# Patient Record
Sex: MALE | Race: WHITE | NOT HISPANIC OR LATINO | URBAN - METROPOLITAN AREA
[De-identification: names, ages, dates, MRNs, and addresses within clinical notes are randomized per-mention and may not be internally consistent; named-entity substitution may affect disease eponyms.]

---

## 2017-02-14 ENCOUNTER — OUTPATIENT (OUTPATIENT)
Dept: OUTPATIENT SERVICES | Facility: HOSPITAL | Age: 32
LOS: 1 days | Discharge: HOME | End: 2017-02-14

## 2017-02-15 DIAGNOSIS — F11.20 OPIOID DEPENDENCE, UNCOMPLICATED: ICD-10-CM

## 2017-08-01 ENCOUNTER — OUTPATIENT (OUTPATIENT)
Dept: OUTPATIENT SERVICES | Facility: HOSPITAL | Age: 32
LOS: 1 days | End: 2017-08-01
Payer: MEDICAID

## 2017-08-04 DIAGNOSIS — R69 ILLNESS, UNSPECIFIED: ICD-10-CM

## 2017-10-01 PROCEDURE — G9001: CPT

## 2018-06-12 ENCOUNTER — OUTPATIENT (OUTPATIENT)
Dept: OUTPATIENT SERVICES | Facility: HOSPITAL | Age: 33
LOS: 1 days | End: 2018-06-12

## 2018-06-12 VITALS
SYSTOLIC BLOOD PRESSURE: 110 MMHG | DIASTOLIC BLOOD PRESSURE: 74 MMHG | TEMPERATURE: 98 F | OXYGEN SATURATION: 98 % | WEIGHT: 190.04 LBS | HEIGHT: 69 IN | RESPIRATION RATE: 16 BRPM | HEART RATE: 65 BPM

## 2018-06-12 DIAGNOSIS — S82.899A OTHER FRACTURE OF UNSPECIFIED LOWER LEG, INITIAL ENCOUNTER FOR CLOSED FRACTURE: Chronic | ICD-10-CM

## 2018-06-12 DIAGNOSIS — M19.271 SECONDARY OSTEOARTHRITIS, RIGHT ANKLE AND FOOT: ICD-10-CM

## 2018-06-12 DIAGNOSIS — R06.83 SNORING: ICD-10-CM

## 2018-06-12 DIAGNOSIS — F41.9 ANXIETY DISORDER, UNSPECIFIED: ICD-10-CM

## 2018-06-12 DIAGNOSIS — S02.609A FRACTURE OF MANDIBLE, UNSPECIFIED, INITIAL ENCOUNTER FOR CLOSED FRACTURE: Chronic | ICD-10-CM

## 2018-06-12 LAB
BUN SERPL-MCNC: 14 MG/DL — SIGNIFICANT CHANGE UP (ref 7–23)
CALCIUM SERPL-MCNC: 9.6 MG/DL — SIGNIFICANT CHANGE UP (ref 8.4–10.5)
CHLORIDE SERPL-SCNC: 94 MMOL/L — LOW (ref 98–107)
CO2 SERPL-SCNC: 27 MMOL/L — SIGNIFICANT CHANGE UP (ref 22–31)
CREAT SERPL-MCNC: 0.87 MG/DL — SIGNIFICANT CHANGE UP (ref 0.5–1.3)
GLUCOSE SERPL-MCNC: 73 MG/DL — SIGNIFICANT CHANGE UP (ref 70–99)
HCT VFR BLD CALC: 41.6 % — SIGNIFICANT CHANGE UP (ref 39–50)
HGB BLD-MCNC: 13.8 G/DL — SIGNIFICANT CHANGE UP (ref 13–17)
MCHC RBC-ENTMCNC: 28.5 PG — SIGNIFICANT CHANGE UP (ref 27–34)
MCHC RBC-ENTMCNC: 33.2 % — SIGNIFICANT CHANGE UP (ref 32–36)
MCV RBC AUTO: 86 FL — SIGNIFICANT CHANGE UP (ref 80–100)
NRBC # FLD: 0 — SIGNIFICANT CHANGE UP
PLATELET # BLD AUTO: 272 K/UL — SIGNIFICANT CHANGE UP (ref 150–400)
PMV BLD: 10 FL — SIGNIFICANT CHANGE UP (ref 7–13)
POTASSIUM SERPL-MCNC: 4.4 MMOL/L — SIGNIFICANT CHANGE UP (ref 3.5–5.3)
POTASSIUM SERPL-SCNC: 4.4 MMOL/L — SIGNIFICANT CHANGE UP (ref 3.5–5.3)
RBC # BLD: 4.84 M/UL — SIGNIFICANT CHANGE UP (ref 4.2–5.8)
RBC # FLD: 13.4 % — SIGNIFICANT CHANGE UP (ref 10.3–14.5)
SODIUM SERPL-SCNC: 136 MMOL/L — SIGNIFICANT CHANGE UP (ref 135–145)
WBC # BLD: 7.92 K/UL — SIGNIFICANT CHANGE UP (ref 3.8–10.5)
WBC # FLD AUTO: 7.92 K/UL — SIGNIFICANT CHANGE UP (ref 3.8–10.5)
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
## 2018-06-12 NOTE — H&P PST ADULT - ACTIVITY
walk daily, climb up 1 flight of stair without sob, adls, used to do wt lifting, exercise at gym - not anymore d/t pain

## 2018-06-12 NOTE — H&P PST ADULT - NEGATIVE GENERAL GENITOURINARY SYMPTOMS
no flank pain L/no urine discoloration/no hematuria/no flank pain R/no bladder infections/no gas in urine/no dysuria/normal urinary frequency/no urinary hesitancy

## 2018-06-12 NOTE — H&P PST ADULT - NEGATIVE ALLERGY TYPES
no indoor environmental allergies/no reactions to medicines/no reactions to food/no outdoor environmental allergies

## 2018-06-12 NOTE — H&P PST ADULT - FAMILY HISTORY
Father  Still living? Yes, Estimated age: Age Unknown  Family history of kidney cancer, Age at diagnosis: Age Unknown

## 2018-06-12 NOTE — H&P PST ADULT - PMH
Heroin abuse ADD (attention deficit disorder)    Anxiety    Heroin abuse  pt states, "stopped using heroin 3 years ago"
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
## 2018-06-12 NOTE — H&P PST ADULT - RS GEN PE MLT RESP DETAILS PC
clear to auscultation bilaterally/no rales/no rhonchi/no wheezes/respirations non-labored/good air movement/breath sounds equal

## 2018-06-12 NOTE — H&P PST ADULT - MUSCULOSKELETAL
details… detailed exam normal strength/joint swelling/right ankle - tenderness on palpation/no calf tenderness

## 2018-06-12 NOTE — H&P PST ADULT - PROBLEM SELECTOR PLAN 1
Scheduled for right ankle fusion, bone graft fibula, removal of hardware on 6/22/2018. labs done and results pending. Surgical scrub & preop instruction given and explained. Famotidine provided with instruction. Verbalized understanding.

## 2018-06-21 ENCOUNTER — TRANSCRIPTION ENCOUNTER (OUTPATIENT)
Age: 33
End: 2018-06-21

## 2018-06-22 ENCOUNTER — OUTPATIENT (OUTPATIENT)
Dept: OUTPATIENT SERVICES | Facility: HOSPITAL | Age: 33
LOS: 1 days | Discharge: HOME | End: 2018-06-22

## 2018-06-22 VITALS
RESPIRATION RATE: 16 BRPM | OXYGEN SATURATION: 97 % | SYSTOLIC BLOOD PRESSURE: 127 MMHG | TEMPERATURE: 98 F | HEIGHT: 69 IN | WEIGHT: 190.04 LBS | HEART RATE: 82 BPM | DIASTOLIC BLOOD PRESSURE: 78 MMHG

## 2018-06-22 VITALS
SYSTOLIC BLOOD PRESSURE: 122 MMHG | HEART RATE: 72 BPM | OXYGEN SATURATION: 100 % | RESPIRATION RATE: 16 BRPM | DIASTOLIC BLOOD PRESSURE: 73 MMHG

## 2018-06-22 DIAGNOSIS — S02.609A FRACTURE OF MANDIBLE, UNSPECIFIED, INITIAL ENCOUNTER FOR CLOSED FRACTURE: Chronic | ICD-10-CM

## 2018-06-22 DIAGNOSIS — M19.271 SECONDARY OSTEOARTHRITIS, RIGHT ANKLE AND FOOT: ICD-10-CM

## 2018-06-22 DIAGNOSIS — S82.899A OTHER FRACTURE OF UNSPECIFIED LOWER LEG, INITIAL ENCOUNTER FOR CLOSED FRACTURE: Chronic | ICD-10-CM

## 2018-06-22 NOTE — ASU PREOPERATIVE ASSESSMENT, ADULT ([facility] ONLY) - TEACHING/LEARNING LEARNING PREFERENCES
individual instruction/pictorial/skill demonstration/verbal instruction/group instruction/written material

## 2018-06-22 NOTE — ASU DISCHARGE PLAN (ADULT/PEDIATRIC). - ACTIVITY LEVEL
NON WEIGHT BEARING TO RIGHT FOOT/no sports/gym/no exercise/no heavy lifting/no weight bearing/elevate extremity/no tub baths

## 2018-06-22 NOTE — ASU DISCHARGE PLAN (ADULT/PEDIATRIC). - NOTIFY
Persistent Nausea and Vomiting/Fever greater than 101/Inability to Tolerate Liquids or Foods/Pain not relieved by Medications/Bleeding that does not stop/Swelling that continues

## 2018-07-28 PROBLEM — F41.9 ANXIETY DISORDER, UNSPECIFIED: Chronic | Status: ACTIVE | Noted: 2018-06-12

## 2018-07-28 PROBLEM — F98.8 OTHER SPECIFIED BEHAVIORAL AND EMOTIONAL DISORDERS WITH ONSET USUALLY OCCURRING IN CHILDHOOD AND ADOLESCENCE: Chronic | Status: ACTIVE | Noted: 2018-06-12

## 2018-08-16 ENCOUNTER — OUTPATIENT (OUTPATIENT)
Dept: OUTPATIENT SERVICES | Facility: HOSPITAL | Age: 33
LOS: 1 days | End: 2018-08-16
Payer: MEDICAID

## 2018-08-16 VITALS
WEIGHT: 195.11 LBS | DIASTOLIC BLOOD PRESSURE: 72 MMHG | HEIGHT: 70 IN | TEMPERATURE: 98 F | SYSTOLIC BLOOD PRESSURE: 129 MMHG | HEART RATE: 83 BPM | RESPIRATION RATE: 16 BRPM | OXYGEN SATURATION: 97 %

## 2018-08-16 DIAGNOSIS — S82.899A OTHER FRACTURE OF UNSPECIFIED LOWER LEG, INITIAL ENCOUNTER FOR CLOSED FRACTURE: Chronic | ICD-10-CM

## 2018-08-16 DIAGNOSIS — S02.609A FRACTURE OF MANDIBLE, UNSPECIFIED, INITIAL ENCOUNTER FOR CLOSED FRACTURE: Chronic | ICD-10-CM

## 2018-08-16 DIAGNOSIS — S91.301A UNSPECIFIED OPEN WOUND, RIGHT FOOT, INITIAL ENCOUNTER: ICD-10-CM

## 2018-08-16 DIAGNOSIS — Z01.818 ENCOUNTER FOR OTHER PREPROCEDURAL EXAMINATION: ICD-10-CM

## 2018-08-16 DIAGNOSIS — F11.10 OPIOID ABUSE, UNCOMPLICATED: ICD-10-CM

## 2018-08-16 DIAGNOSIS — T14.8XXA OTHER INJURY OF UNSPECIFIED BODY REGION, INITIAL ENCOUNTER: ICD-10-CM

## 2018-08-16 PROCEDURE — G0463: CPT

## 2018-08-16 PROCEDURE — 85027 COMPLETE CBC AUTOMATED: CPT

## 2018-08-16 RX ORDER — OXYCODONE HYDROCHLORIDE 5 MG/1
1 TABLET ORAL
Qty: 0 | Refills: 0 | COMMUNITY

## 2018-08-16 RX ORDER — METHADONE HYDROCHLORIDE 40 MG/1
1 TABLET ORAL
Qty: 0 | Refills: 0 | COMMUNITY
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
## 2018-09-07 PROBLEM — F32.9 MAJOR DEPRESSIVE DISORDER, SINGLE EPISODE, UNSPECIFIED: Chronic | Status: ACTIVE | Noted: 2018-08-16

## 2018-09-07 PROBLEM — S82.891S OTHER FRACTURE OF RIGHT LOWER LEG, SEQUELA: Chronic | Status: ACTIVE | Noted: 2018-08-16

## 2018-09-07 PROBLEM — T14.8XXA OTHER INJURY OF UNSPECIFIED BODY REGION, INITIAL ENCOUNTER: Chronic | Status: ACTIVE | Noted: 2018-08-16
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
## 2018-09-14 ENCOUNTER — APPOINTMENT (OUTPATIENT)
Dept: WOUND CARE | Facility: HOSPITAL | Age: 33
End: 2018-09-14

## 2018-10-01 ENCOUNTER — APPOINTMENT (OUTPATIENT)
Dept: WOUND CARE | Facility: CLINIC | Age: 33
End: 2018-10-01

## 2020-04-13 NOTE — H&P PST ADULT - PRO INTERPRETER NEED 2
NUTRITION    Nursing Referral: [facility]     RECOMMENDATIONS / PLAN:     - Add supplement: Glucerna Shake BID  - Continue RD inpatient monitoring and evaluation. NUTRITION INTERVENTIONS & DIAGNOSIS:     - Meals/snacks: modified composition  - Medical food supplement therapy: initiate     Nutrition Diagnosis: Unintended weight loss related to predicted prolonged inadequate energy intake as evidenced by wt loss of 14% x 5 years PTA    ASSESSMENT:     Per chart review, pt admitted with c/o worsening cough and SOB; COPD exacerbation and suspected COVID-19 virus infection. Remote assessment via telephone attempted; unsuccessful. Per chart, pt had poor po intake of breakfast today; noted wt loss in last 5 years per chart hx.     Nutritional intake adequate to meet patients estimated nutritional needs:  Unable to determine at this time    Diet: DIET DIABETIC CONSISTENT CARB Regular      Food Allergies: none known   Current Appetite: Unable to determine at this time  Appetite/meal intake prior to admission: Unable to determine at this time  Feeding Limitations:  [] Swallowing difficulty    [] Chewing difficulty    [] Other:  Current Meal Intake:   Patient Vitals for the past 100 hrs:   % Diet Eaten   04/13/20 0935 25 %       BM: 4/12  Skin Integrity:  No pressure injury or wound noted  Edema:   [x] No     [] Yes   Pertinent Medications: Reviewed: bowel regimen, cholecalciferol, famotidine, SSI, mag-ox, ondansetron, zinc sulfate    Recent Labs     04/13/20  1105 04/12/20  1449    142   K 3.9 3.8    105   CO2 29 30   * 109*   BUN 18 15   CREA 1.09 1.01   CA 8.8 10.2*   MG 1.5*  --    PHOS 3.2  --    ALB 2.9*  --    SGOT 24  --    ALT 17  --        Intake/Output Summary (Last 24 hours) at 4/13/2020 1649  Last data filed at 4/13/2020 0935  Gross per 24 hour   Intake 320 ml   Output 100 ml   Net 220 ml       Anthropometrics:  Ht Readings from Last 1 Encounters:   04/12/20 5' 4\" (1.626 m)     Last 3 Recorded Weights in this Encounter    04/12/20 1425   Weight: 72.6 kg (160 lb)     Body mass index is 27.46 kg/m². Weight History:  -26 lb, 14% x 5 years PTA per chart hx    Weight Metrics 4/12/2020 2/20/2020 12/18/2019 8/26/2019 7/11/2019 7/3/2019 2/2/2019   Weight 160 lb 160 lb 170 lb 171 lb 3.2 oz 168 lb 8 oz 166 lb 9.6 oz 168 lb   BMI 27.46 kg/m2 27.46 kg/m2 29.18 kg/m2 29.39 kg/m2 28.92 kg/m2 28.6 kg/m2 28.84 kg/m2        Admitting Diagnosis: COPD exacerbation (HCC) [J44.1]  COPD exacerbation (HCC) [J44.1]  Pertinent PMHx: DM, HTN, HLD, pneumonia, COPD    Education Needs:        [x] None identified  [] Identified - Not appropriate at this time  []  Identified and addressed - refer to education log  Learning Limitations:   [x] None identified  [] Identified    Cultural, Samaritan & ethnic food preferences:  [x] None identified    [] Identified and addressed     ESTIMATED NUTRITION NEEDS:     Calories: 5647-3677 kcal (25-30 kcal/kg) based on  [x] Actual BW: 73 kg      [] IBW   Protein:  gm (1-1.5 gm/kg) based on  [x] Actual BW      [] IBW   Fluid: 1 mL/kcal     MONITORING & EVALUATION:     Nutrition Goal(s):   - PO nutrition intake will meet >75% of patient estimated nutritional needs within the next 7 days. Outcome: New/Initial goal     Monitoring:   [x] Food and nutrient intake   [x] Food and nutrient administration  [x] Comparative standards   [x] Nutrition-focused physical findings   [x] Anthropometric Measurements   [x] Treatment/therapy   [x] Biochemical data, medical tests, and procedures        Previous Recommendations (for follow-up assessments only):  Not Applicable     Discharge Planning: No nutritional discharge needs at this time. Participated in care planning, discharge planning, & interdisciplinary rounds as appropriate.       Alie Corral RD  Pager: [phone] English

## 2020-07-02 NOTE — H&P PST ADULT - PRO PAIN EXPRESSION
verbalization Drysol Pregnancy And Lactation Text: This medication is considered safe during pregnancy and breast feeding.

## 2020-08-24 ENCOUNTER — TRANSCRIPTION ENCOUNTER (OUTPATIENT)
Age: 35
End: 2020-08-24

## 2021-04-07 ENCOUNTER — APPOINTMENT (OUTPATIENT)
Dept: DERMATOLOGY | Facility: CLINIC | Age: 36
End: 2021-04-07

## 2021-06-24 ENCOUNTER — EMERGENCY (EMERGENCY)
Facility: HOSPITAL | Age: 36
LOS: 1 days | Discharge: HOME | End: 2021-06-24
Admitting: EMERGENCY MEDICINE
Payer: MEDICAID

## 2021-06-24 VITALS
SYSTOLIC BLOOD PRESSURE: 136 MMHG | HEART RATE: 73 BPM | TEMPERATURE: 98 F | RESPIRATION RATE: 16 BRPM | HEIGHT: 70 IN | OXYGEN SATURATION: 98 % | DIASTOLIC BLOOD PRESSURE: 86 MMHG

## 2021-06-24 DIAGNOSIS — S02.609A FRACTURE OF MANDIBLE, UNSPECIFIED, INITIAL ENCOUNTER FOR CLOSED FRACTURE: Chronic | ICD-10-CM

## 2021-06-24 DIAGNOSIS — S82.899A OTHER FRACTURE OF UNSPECIFIED LOWER LEG, INITIAL ENCOUNTER FOR CLOSED FRACTURE: Chronic | ICD-10-CM

## 2021-06-24 PROCEDURE — 99284 EMERGENCY DEPT VISIT MOD MDM: CPT
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
## 2021-06-25 RX ORDER — LORATADINE 10 MG/1
1 TABLET ORAL
Qty: 20 | Refills: 0
Start: 2021-06-25 | End: 2021-07-04

## 2021-06-25 RX ORDER — PERMETHRIN CREAM 5% W/W 50 MG/G
1 CREAM TOPICAL
Qty: 50 | Refills: 0
Start: 2021-06-25 | End: 2021-06-25

## 2021-06-25 NOTE — ED PROVIDER NOTE - OBJECTIVE STATEMENT
34 Y/O M w/ no PMH presents to ER for rash throughout body. Has experienced rash throughout body for the past month. Not itching, not painful, no drainage noted. Has seen multiple ER's for same complaint. RX multiple scripts with minimal relief. States he recently washed sheets. Noticed rash after showering, after sleeping. No new detergents/soap, clothing, dietary changes.

## 2021-06-25 NOTE — ED PROVIDER NOTE - CLINICAL SUMMARY MEDICAL DECISION MAKING FREE TEXT BOX
34 Y/O M w/ no PMH presents to ER for rash throughout body.   Permethrin for suspected scabies. Discussed good hygiene practices  Referral to Dermatology

## 2021-06-25 NOTE — ED PROVIDER NOTE - NSFOLLOWUPCLINICS_GEN_ALL_ED_FT
[facility]  Dermatology  [address], [address]  [place] [address]  Phone: [phone]  Fax:   Follow Up Time: Routine

## 2021-06-25 NOTE — ED PROVIDER NOTE - CHIEF COMPLAINT
The patient is a 35y Male complaining of  The patient is a 35y Male complaining of dots all over body

## 2021-06-25 NOTE — ED PROVIDER NOTE - PHYSICAL EXAMINATION
Vital signs reviewed.   CONSTITUTIONAL: Well-appearing; well-nourished; in no apparent distress. Non-toxic appearing.   HEAD: Normocephalic, atraumatic.  EYES: PERRL, EOM intact, normal conjunctiva and no sclera injection noted  ENT: normal nose; no rhinorrhea  NECK/LYMPH: Supple; non-tender; no cervical lymphadenopathy.  CARD: Normal S1, S2  RESP: Normal chest excursion with respiration; breath sounds clear and equal bilaterally  EXT/MS: moves all extremities; distal pulses are normal, no pedal edema.  SKIN: Normal for age and race; warm; dry; good turgor; no apparent lesions or exudate noted. Coalescing papules/excoriations noted throughout hands and upper extremities. No rash/hives noted on upper/lower extremities. No oral lesion noted.  NEURO: Awake, alert, oriented x 3  PSYCH: Normal mood; appropriate affect.

## 2021-06-25 NOTE — ED PROVIDER NOTE - NSFOLLOWUPINSTRUCTIONS_ED_ALL_ED_FT
Rash    A rash is a change in the color of the skin. A rash can also change the way your skin feels. There are many different conditions and factors that can cause a rash, most of which are not dangerous. Make sure to follow up with your primary care physician or a dermatologist as instructed by your health care provider.    SEEK IMMEDIATE MEDICAL CARE IF YOU HAVE ANY OF THE FOLLOWING SYMPTOMS: fever, blisters, a rash inside your mouth, vaginal or anal pain, or altered mental status.    Please take medications as directed  Please follow up with dermatology for further evaluation

## 2021-06-30 ENCOUNTER — OUTPATIENT (OUTPATIENT)
Dept: OUTPATIENT SERVICES | Facility: HOSPITAL | Age: 36
LOS: 1 days | End: 2021-06-30

## 2021-06-30 ENCOUNTER — APPOINTMENT (OUTPATIENT)
Dept: INTERNAL MEDICINE | Facility: CLINIC | Age: 36
End: 2021-06-30
Payer: MEDICAID

## 2021-06-30 VITALS
BODY MASS INDEX: 29.4 KG/M2 | DIASTOLIC BLOOD PRESSURE: 80 MMHG | HEIGHT: 71 IN | WEIGHT: 210 LBS | RESPIRATION RATE: 17 BRPM | SYSTOLIC BLOOD PRESSURE: 115 MMHG | OXYGEN SATURATION: 98 % | HEART RATE: 80 BPM

## 2021-06-30 DIAGNOSIS — R12 HEARTBURN: ICD-10-CM

## 2021-06-30 DIAGNOSIS — F11.11 OPIOID ABUSE, IN REMISSION: ICD-10-CM

## 2021-06-30 DIAGNOSIS — R21 RASH AND OTHER NONSPECIFIC SKIN ERUPTION: ICD-10-CM

## 2021-06-30 DIAGNOSIS — Z98.890 OTHER SPECIFIED POSTPROCEDURAL STATES: ICD-10-CM

## 2021-06-30 DIAGNOSIS — F14.90 COCAINE USE, UNSPECIFIED, UNCOMPLICATED: ICD-10-CM

## 2021-06-30 DIAGNOSIS — Z78.9 OTHER SPECIFIED HEALTH STATUS: ICD-10-CM

## 2021-06-30 DIAGNOSIS — F17.200 NICOTINE DEPENDENCE, UNSPECIFIED, UNCOMPLICATED: ICD-10-CM

## 2021-06-30 DIAGNOSIS — S02.609A FRACTURE OF MANDIBLE, UNSPECIFIED, INITIAL ENCOUNTER FOR CLOSED FRACTURE: Chronic | ICD-10-CM

## 2021-06-30 DIAGNOSIS — Z87.898 PERSONAL HISTORY OF OTHER SPECIFIED CONDITIONS: ICD-10-CM

## 2021-06-30 DIAGNOSIS — S82.899A OTHER FRACTURE OF UNSPECIFIED LOWER LEG, INITIAL ENCOUNTER FOR CLOSED FRACTURE: Chronic | ICD-10-CM

## 2021-06-30 PROCEDURE — 99203 OFFICE O/P NEW LOW 30 MIN: CPT | Mod: GC

## 2021-06-30 NOTE — REVIEW OF SYSTEMS
[Heartburn] : heartburn [Skin Rash] : skin rash [Fever] : no fever [Chills] : no chills [Night Sweats] : no night sweats [Earache] : no earache [Hearing Loss] : no hearing loss [Chest Pain] : no chest pain [Palpitations] : no palpitations [Shortness Of Breath] : no shortness of breath [Wheezing] : no wheezing [Cough] : no cough [Abdominal Pain] : no abdominal pain [Nausea] : no nausea [Constipation] : no constipation [Diarrhea] : no diarrhea [Dysuria] : no dysuria [Hematuria] : no hematuria [Frequency] : no frequency [Joint Pain] : no joint pain [Joint Stiffness] : no joint stiffness [Muscle Pain] : no muscle pain [Back Pain] : no back pain [Itching] : no itching [Hair Changes] : no hair changes [Headache] : no headache [Dizziness] : no dizziness [Confusion] : no confusion [Suicidal] : not suicidal [Insomnia] : no insomnia [Anxiety] : no anxiety [Depression] : no depression [FreeTextEntry7] : w

## 2021-06-30 NOTE — HISTORY OF PRESENT ILLNESS
[FreeTextEntry1] : Post-ED visit establish care [de-identified] : The patient is a 34 y/o M who presents for a post-hospital visit. The patient presented to the ED on 6/25 for a rash. The patient was referred to dermatology. Appointment has yet to be set up. \par \par #Rash:\par - Patient has had a rash on his chin, lip and scalp for the past 3 months. He reports it was non-pruritic, not painful. He went to urgent care 2x, and has been given Bactrim and doxycycline which he reports has not helped in clearing up the rash\par - The patient recently went to the ED on 6/25, the patient was referred to dermatology however he reported that the dermatologist he was referred to was not covered by insurance. He requested for a referral to a dermatologist within his network. \par \par #HCM\par - Patient was counseled on HIV and Hep B and C testing, patient requested to defer to next visit due to his time constraints\par \par #Social:\par - patient is currently on treatment for heroin abuse at [facility]. Patient has been taking methadone for the past 8 months. He reports occasional relapses w/ heroin and cocaine\par - Patient does not drink\par - Patient smokes 1 ppd

## 2021-06-30 NOTE — PHYSICAL EXAM
[No Acute Distress] : no acute distress [Normal Oropharynx] : the oropharynx was normal [EOMI] : extraocular movements intact [No Respiratory Distress] : no respiratory distress  [No Accessory Muscle Use] : no accessory muscle use [Normal Rate] : normal rate  [Regular Rhythm] : with a regular rhythm [Normal S1, S2] : normal S1 and S2 [Soft] : abdomen soft [Non Tender] : non-tender [Non-distended] : non-distended [No Joint Swelling] : no joint swelling [Grossly Normal Strength/Tone] : grossly normal strength/tone [Normal] : no joint swelling and grossly normal strength and tone [Coordination Grossly Intact] : coordination grossly intact [No Focal Deficits] : no focal deficits [Normal Gait] : normal gait [de-identified] : 2x erythematous 1 cm papules on his chin, 2 cm papule on L angle of his mouth, 1 healed surgical scar on R ankle

## 2021-06-30 NOTE — ASSESSMENT
[FreeTextEntry1] : The patient is a 36 y/o M who presents for a post-hospital visit. The patient presented to the ED on 6/25 for a rash. The patient was referred to dermatology. Appointment has yet to be set up. \par \par #Rash\par - Unclear etiology of rash, possibly HSV vs tick vs contact dermatitis\par - Referred to dermatology\par - Will f/u with patient after his dermatology visit\par \par \par #Regular Nicotine Use\par - Patient was counseled on reducing nicotine use. Patient reported that he is not ready for nicotine reducing medication, and would like to address this more in depth next visit\par \par #Heartburn\par - Counseled patient on reducing NSAID use to reduce pain, also discussed LSM such as lowering fast food intake \par \par #HCM\par - Discussed general HCM measures including baseline bloodwork as well as HIV and Hep C testing. Patient deferred this for next visit since he said he was in a hurry today. \par - Patient needs CBC, CMP, UA, Lipid Panel, A1c as well as HIV, Hep B, Hep C testing given his high risk status as a recent IV drug user. \par \par RTC in 5 weeks, Case discussed with Dr. Pena\par \par Byron Degroot\par PGY1 - [employer]\par \par

## 2021-06-30 NOTE — HEALTH RISK ASSESSMENT
[] : Yes [11-15] : 11-15 [No] : No [Yes] : In the past 12 months have you used drugs other than those required for medical reasons? Yes [0] : 1) Little interest or pleasure doing things: Not at all (0) [1] : 2) Feeling down, depressed, or hopeless for several days (1) [HIV Test offered] : HIV Test offered [Hepatitis C test offered] : Hepatitis C test offered [de-identified] : 1 ppd [de-identified] : heroin and cocaine, patient is currently on methadone for treatment of heroin abuse at [facility] [de-identified] : Patient eats a diet heavy on fast food and pizza [[id number]] : 1 [HIVComments] : deferred to next visit [HepatitisCComments] : deferred to next visit

## 2021-07-16 ENCOUNTER — APPOINTMENT (OUTPATIENT)
Dept: DERMATOLOGY | Facility: CLINIC | Age: 36
End: 2021-07-16

## 2021-07-22 ENCOUNTER — APPOINTMENT (OUTPATIENT)
Dept: DERMATOLOGY | Facility: CLINIC | Age: 36
End: 2021-07-22
Payer: MEDICAID

## 2021-07-22 ENCOUNTER — LABORATORY RESULT (OUTPATIENT)
Age: 36
End: 2021-07-22

## 2021-07-22 ENCOUNTER — NON-APPOINTMENT (OUTPATIENT)
Age: 36
End: 2021-07-22

## 2021-07-22 DIAGNOSIS — D48.9 NEOPLASM OF UNCERTAIN BEHAVIOR, UNSPECIFIED: ICD-10-CM

## 2021-07-22 DIAGNOSIS — F22 DELUSIONAL DISORDERS: ICD-10-CM

## 2021-07-22 DIAGNOSIS — B35.3 TINEA PEDIS: ICD-10-CM

## 2021-07-22 PROCEDURE — 99204 OFFICE O/P NEW MOD 45 MIN: CPT | Mod: 25

## 2021-07-22 PROCEDURE — 11102 TANGNTL BX SKIN SINGLE LES: CPT

## 2021-07-22 PROCEDURE — 99072 ADDL SUPL MATRL&STAF TM PHE: CPT

## 2021-07-22 NOTE — PHYSICAL EXAM
[FreeTextEntry3] : AAOx3, pleasant, NAD, no visual lymphadenopathy\par hair, scalp, face, nose, eyelids, ears, lips, oropharynx, neck, chest, abdomen, back, right arm, left arm, nails, and hands examined with all normal findings,\par pertinent findings include:\par \par clear skin; few excoriations\par scaling between toes

## 2021-07-22 NOTE — ASSESSMENT
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 parasites vs. normal skin\par  \par Shave biopsy performed today over above location, risks and benefits discussed including incomplete removal, not enough tissue for diagnosis scarring and infection, informed consent obtained, pictures taken,  cleaned with alcohol and anesthetized with 1%lido+epi, 0.3 cc total, hemostasis obtained with monsels, vaseline and bandaid placed, tolerated well, wound care reviewed, specimen sent to pathology.\par \par tinea pedis\par ketoconazole cream BID; SED

## 2021-07-22 NOTE — HISTORY OF PRESENT ILLNESS
[FreeTextEntry1] : blisters [de-identified] : 35 year old male here with blisters. uncomfortable. going on for months. sensation of crawling.

## 2021-07-29 ENCOUNTER — NON-APPOINTMENT (OUTPATIENT)
Age: 36
End: 2021-07-29

## 2021-08-03 ENCOUNTER — APPOINTMENT (OUTPATIENT)
Dept: INTERNAL MEDICINE | Facility: CLINIC | Age: 36
End: 2021-08-03

## 2021-08-31 ENCOUNTER — APPOINTMENT (OUTPATIENT)
Dept: DERMATOLOGY | Facility: CLINIC | Age: 36
End: 2021-08-31

## 2021-10-13 ENCOUNTER — APPOINTMENT (OUTPATIENT)
Dept: INTERNAL MEDICINE | Facility: CLINIC | Age: 36
End: 2021-10-13

## 2021-10-18 ENCOUNTER — NON-APPOINTMENT (OUTPATIENT)
Age: 36
End: 2021-10-18

## 2021-10-18 ENCOUNTER — APPOINTMENT (OUTPATIENT)
Dept: INTERNAL MEDICINE | Facility: CLINIC | Age: 36
End: 2021-10-18
Payer: MEDICAID

## 2021-10-18 VITALS
TEMPERATURE: 97.4 F | HEIGHT: 71 IN | WEIGHT: 200 LBS | BODY MASS INDEX: 28 KG/M2 | HEART RATE: 85 BPM | DIASTOLIC BLOOD PRESSURE: 70 MMHG | OXYGEN SATURATION: 96 % | SYSTOLIC BLOOD PRESSURE: 116 MMHG | RESPIRATION RATE: 14 BRPM

## 2021-10-18 DIAGNOSIS — Z23 ENCOUNTER FOR IMMUNIZATION: ICD-10-CM

## 2021-10-18 DIAGNOSIS — Z00.00 ENCOUNTER FOR GENERAL ADULT MEDICAL EXAMINATION W/OUT ABNORMAL FINDINGS: ICD-10-CM

## 2021-10-18 DIAGNOSIS — Q18.1 PREAURICULAR SINUS AND CYST: ICD-10-CM

## 2021-10-18 PROCEDURE — 93000 ELECTROCARDIOGRAM COMPLETE: CPT

## 2021-10-18 PROCEDURE — 99395 PREV VISIT EST AGE 18-39: CPT | Mod: 25

## 2021-10-18 PROCEDURE — 99385 PREV VISIT NEW AGE 18-39: CPT | Mod: 25

## 2021-10-18 RX ORDER — DOXYCYCLINE HYCLATE 100 MG/1
100 TABLET ORAL
Qty: 28 | Refills: 3 | Status: DISCONTINUED | COMMUNITY
Start: 2021-07-22 | End: 2021-10-18

## 2021-10-18 RX ORDER — METHADONE HYDROCHLORIDE 10 MG/1
10 TABLET ORAL DAILY
Qty: 130 | Refills: 0 | Status: ACTIVE | COMMUNITY
Start: 2021-06-30

## 2021-10-18 RX ORDER — CEPHALEXIN 500 MG/1
500 CAPSULE ORAL
Qty: 14 | Refills: 0 | Status: DISCONTINUED | COMMUNITY
Start: 2021-10-05

## 2021-10-18 RX ORDER — KETOCONAZOLE 20 MG/G
2 CREAM TOPICAL TWICE DAILY
Qty: 1 | Refills: 3 | Status: DISCONTINUED | COMMUNITY
Start: 2021-07-22 | End: 2021-10-18

## 2021-10-18 NOTE — HEALTH RISK ASSESSMENT
[Good] : ~his/her~  mood as  good [] : Yes [No] : In the past 12 months have you used drugs other than those required for medical reasons? No [No falls in past year] : Patient reported no falls in the past year [0] : 2) Feeling down, depressed, or hopeless: Not at all (0) [PHQ-2 Negative - No further assessment needed] : PHQ-2 Negative - No further assessment needed [de-identified] : about 1 PPD [[id number]] : 0

## 2021-10-18 NOTE — END OF VISIT
[FreeTextEntry3] : "I, Gracia Whitmore, personally scribed the services dictated to me by Dr. Narciso Martínez MD in this documentation on 10/18/2021 "\par \par "I Dr. Narciso Martínez MD, personally performed the services described in this documentation on 10/18/2021 for the patient as scribed by Gracia Whitmore in my presence. I have reviewed and verified that all the information is accurate and true."\par \par

## 2021-10-18 NOTE — PLAN
[FreeTextEntry1] : Further instructions pending lab results \par Continue medications \par sent to ENT

## 2021-10-18 NOTE — HISTORY OF PRESENT ILLNESS
[Friend] : friend [FreeTextEntry1] : annual physical and new patient establishing care  [de-identified] : MAGALY AGUILARNGELIS is a 36 year old M who presents today for establishing care and annual physical. Pt complains of R ear pain

## 2021-10-19 ENCOUNTER — TRANSCRIPTION ENCOUNTER (OUTPATIENT)
Age: 36
End: 2021-10-19

## 2021-10-19 ENCOUNTER — APPOINTMENT (OUTPATIENT)
Dept: OTOLARYNGOLOGY | Facility: CLINIC | Age: 36
End: 2021-10-19
Payer: MEDICAID

## 2021-10-19 VITALS
DIASTOLIC BLOOD PRESSURE: 71 MMHG | WEIGHT: 200 LBS | HEART RATE: 76 BPM | BODY MASS INDEX: 28 KG/M2 | SYSTOLIC BLOOD PRESSURE: 121 MMHG | HEIGHT: 71 IN

## 2021-10-19 PROCEDURE — 99203 OFFICE O/P NEW LOW 30 MIN: CPT

## 2021-10-19 NOTE — ASSESSMENT
[FreeTextEntry1] : Discussed nees for I&D compression dressing.  \par WIll follow up with Dr Fraser or Eva

## 2021-10-19 NOTE — PHYSICAL EXAM
[de-identified] : 4x4 cm compressible mass right sup auricle [de-identified] : erythema  [de-identified] : thick white [Normal] : mucosa is normal [Midline] : trachea located in midline position

## 2021-10-19 NOTE — REVIEW OF SYSTEMS
[Nasal Congestion] : nasal congestion [Discolored Nasal Discharge] : discolored nasal discharge [Snoring With Pauses] : snoring with pauses [Hoarseness] : hoarseness [Throat Clearing] : throat clearing [Swollen Glands] : swollen glands [As Noted in HPI] : as noted in HPI [Negative] : Allergies [FreeTextEntry1] : fatigue

## 2021-10-19 NOTE — HISTORY OF PRESENT ILLNESS
[de-identified] : 36 yr old male with swelling right auricle for 2-3 weeks.  It started as a small pimple that he scratched, then got  bigger.  He went to the ER 2 weeks ago, had it drained and it recurred. \par -pain

## 2021-10-20 ENCOUNTER — APPOINTMENT (OUTPATIENT)
Dept: OTOLARYNGOLOGY | Facility: CLINIC | Age: 36
End: 2021-10-20
Payer: MEDICAID

## 2021-10-20 VITALS
SYSTOLIC BLOOD PRESSURE: 129 MMHG | HEIGHT: 71 IN | DIASTOLIC BLOOD PRESSURE: 82 MMHG | BODY MASS INDEX: 28 KG/M2 | HEART RATE: 67 BPM | WEIGHT: 200 LBS

## 2021-10-20 PROCEDURE — 99213 OFFICE O/P EST LOW 20 MIN: CPT | Mod: 25

## 2021-10-20 PROCEDURE — 69005 DRG XTRNL EAR ABSC/HEM COMP: CPT

## 2021-10-20 NOTE — PROCEDURE
[FreeTextEntry1] : I& D of Right auricular hematoma [FreeTextEntry2] : Large right auricular hematoma [FreeTextEntry3] : Right auricular hematoma cleaned with alcohol\par An 18 gauge needle was used to aspirate 10 ml of blood from hematoma\par Dressing of cotton with bacitracin was applied and wrapped with cling

## 2021-10-20 NOTE — ASSESSMENT
[FreeTextEntry1] : Reviewed and reconciled meds,allergies, pmhx, famhx, social hx and surgical hx\par \par 36 y.o male with growth in right auricle for past two months. It grew relatively quickly. Had this lesion drained in ER 2 weeks ago. However, it has swelled again\par \par I& D of Right auricular hematoma\par Right auricular hematoma cleaned with alcohol\par An 18 gauge needle was used to aspirate 10 ml of blood from hematoma\par Dressing of cotton with bacitracin was applied and wrapped with cling\par \par \par Plan\par Keep dressing dry\par Start Cephalexin 500mg BID x 10 days\par Follow-up in 2 days

## 2021-10-20 NOTE — HISTORY OF PRESENT ILLNESS
[de-identified] : 36 y.o male with growth in right auricle for past two months. It grew relatively quickly. Had this lesion drained in ER 2 weeks ago. However, it has swelled again

## 2021-10-20 NOTE — PHYSICAL EXAM
[Normal] : temporomandibular joint is normal [FreeTextEntry6] : large hematoma in right auricle, mostly anterior

## 2021-10-22 ENCOUNTER — APPOINTMENT (OUTPATIENT)
Dept: OTOLARYNGOLOGY | Facility: CLINIC | Age: 36
End: 2021-10-22
Payer: MEDICAID

## 2021-10-22 VITALS
HEART RATE: 75 BPM | DIASTOLIC BLOOD PRESSURE: 75 MMHG | SYSTOLIC BLOOD PRESSURE: 122 MMHG | HEIGHT: 71 IN | WEIGHT: 200 LBS | BODY MASS INDEX: 28 KG/M2

## 2021-10-22 PROCEDURE — 99024 POSTOP FOLLOW-UP VISIT: CPT

## 2021-10-22 PROCEDURE — 69005 DRG XTRNL EAR ABSC/HEM COMP: CPT | Mod: RT,58

## 2021-10-22 NOTE — REASON FOR VISIT
[Subsequent Evaluation] : a subsequent evaluation for [FreeTextEntry2] : Patient here to follow up after ear cyst drainage

## 2021-10-22 NOTE — HISTORY OF PRESENT ILLNESS
[de-identified] : 36 y.o male with growth in right auricle for past two months. It grew relatively quickly. Had this lesion drained in ER 2 weeks ago. However, it has swelled again. We drained the lesion two days ago in the office and placed a dressing with bacitracin which he's had on since. It did fall off but he was able to quickly replace it. There is still pain of the right auricle. He is currently on cephalexin. The lesion has filled with fluid again

## 2021-10-22 NOTE — ASSESSMENT
[FreeTextEntry1] : Reviewed and reconciled meds,allergies, pmhx, famhx, social hx and surgical hx\par \par 36 y.o male with growth in right auricle for past two months. Swelling yet again despite 2 I&D's\par \par I& D of Right auricular hematoma\par Right auricular hematoma cleaned with alcohol\par An 18 gauge needle was used to aspirate 10 ml of blood from hematoma\par Dressing of cotton with bacitracin was applied and wrapped with cling\par \par \par Plan\par Schedule for I&D and reconstruction of right auricle\par Finish Cephalexin 500mg BID x 10 days\par

## 2021-10-23 ENCOUNTER — LABORATORY RESULT (OUTPATIENT)
Age: 36
End: 2021-10-23

## 2021-10-26 ENCOUNTER — APPOINTMENT (OUTPATIENT)
Dept: OTOLARYNGOLOGY | Facility: AMBULATORY MEDICAL SERVICES | Age: 36
End: 2021-10-26
Payer: MEDICAID

## 2021-10-26 ENCOUNTER — RESULT REVIEW (OUTPATIENT)
Age: 36
End: 2021-10-26

## 2021-10-26 PROCEDURE — 13152 CMPLX RPR E/N/E/L 2.6-7.5 CM: CPT | Mod: RT,59,58

## 2021-10-26 PROCEDURE — 69005 DRG XTRNL EAR ABSC/HEM COMP: CPT | Mod: RT,58

## 2021-10-29 ENCOUNTER — APPOINTMENT (OUTPATIENT)
Dept: OTOLARYNGOLOGY | Facility: CLINIC | Age: 36
End: 2021-10-29
Payer: MEDICAID

## 2021-10-29 VITALS
HEIGHT: 71 IN | DIASTOLIC BLOOD PRESSURE: 80 MMHG | BODY MASS INDEX: 28 KG/M2 | SYSTOLIC BLOOD PRESSURE: 132 MMHG | HEART RATE: 64 BPM | WEIGHT: 200 LBS

## 2021-10-29 PROCEDURE — 99024 POSTOP FOLLOW-UP VISIT: CPT

## 2021-10-29 NOTE — HISTORY OF PRESENT ILLNESS
[de-identified] : 36 y.o male presents s/p incision and drainage of right auricular hematoma with reconstruction of right auricle on 10/26/21. Pain is tolerable. Still on antibiotic.

## 2021-10-29 NOTE — ASSESSMENT
[FreeTextEntry1] : reviewed and reconciled meds, allergies, pmhx,famhx,sochx,surghx\par \par 36 y.o male presents s/p incision and drainage of right auricular hematoma with reconstruction of right auricle on 10/26/21

## 2021-11-05 ENCOUNTER — APPOINTMENT (OUTPATIENT)
Dept: OTOLARYNGOLOGY | Facility: CLINIC | Age: 36
End: 2021-11-05
Payer: MEDICAID

## 2021-11-05 VITALS
SYSTOLIC BLOOD PRESSURE: 144 MMHG | DIASTOLIC BLOOD PRESSURE: 75 MMHG | WEIGHT: 200 LBS | HEIGHT: 71 IN | BODY MASS INDEX: 28 KG/M2 | HEART RATE: 94 BPM

## 2021-11-05 DIAGNOSIS — R22.1 LOCALIZED SWELLING, MASS AND LUMP, HEAD: ICD-10-CM

## 2021-11-05 DIAGNOSIS — S00.431A CONTUSION OF RIGHT EAR, INITIAL ENCOUNTER: ICD-10-CM

## 2021-11-05 DIAGNOSIS — R22.0 LOCALIZED SWELLING, MASS AND LUMP, HEAD: ICD-10-CM

## 2021-11-05 PROCEDURE — 99024 POSTOP FOLLOW-UP VISIT: CPT

## 2021-11-05 RX ORDER — CEPHALEXIN 500 MG/1
500 CAPSULE ORAL
Qty: 20 | Refills: 0 | Status: COMPLETED | COMMUNITY
Start: 2021-10-20 | End: 2021-11-05

## 2021-11-05 NOTE — ASSESSMENT
[FreeTextEntry1] : Reviewed and reconciled Meds, allergies, pmhx ,famhx, sochx, surghx\par \par 36 y.o male presents s/p incision and drainage of right auricular hematoma with reconstruction of right auricle on 10/26/21\par \par Pathology 10/26/21  \par Right auricular cartilage\par Final Diagnosis\par Right auricular cartilage, excisional procedure:\par -     Cartilage and soft tissue, histologically unremarkable\par \par  Splints removed\par \par Plan\par Wash normally and pat dry\par F/u 3 week

## 2021-11-05 NOTE — HISTORY OF PRESENT ILLNESS
[de-identified] : 36 y.o male presents s/p incision and drainage of right auricular hematoma with reconstruction of right auricle on 10/26/21. Pain is much better. Completed antibiotic regimen

## 2021-11-05 NOTE — REASON FOR VISIT
[Subsequent Evaluation] : a subsequent evaluation for [FreeTextEntry2] : post sx auricular hematoma

## 2021-11-15 ENCOUNTER — APPOINTMENT (OUTPATIENT)
Dept: INTERNAL MEDICINE | Facility: CLINIC | Age: 36
End: 2021-11-15
Payer: MEDICAID

## 2021-11-15 VITALS
WEIGHT: 205 LBS | DIASTOLIC BLOOD PRESSURE: 80 MMHG | HEIGHT: 71 IN | HEART RATE: 98 BPM | RESPIRATION RATE: 14 BRPM | TEMPERATURE: 97.4 F | OXYGEN SATURATION: 95 % | SYSTOLIC BLOOD PRESSURE: 126 MMHG | BODY MASS INDEX: 28.7 KG/M2

## 2021-11-15 PROCEDURE — 99214 OFFICE O/P EST MOD 30 MIN: CPT

## 2021-11-15 RX ORDER — SULFAMETHOXAZOLE AND TRIMETHOPRIM 800; 160 MG/1; MG/1
800-160 TABLET ORAL
Qty: 14 | Refills: 0 | Status: DISCONTINUED | COMMUNITY
Start: 2021-05-22

## 2021-11-15 RX ORDER — CLINDAMYCIN HYDROCHLORIDE 300 MG/1
300 CAPSULE ORAL
Qty: 30 | Refills: 0 | Status: DISCONTINUED | COMMUNITY
Start: 2021-06-01

## 2021-11-15 RX ORDER — LORATADINE 10 MG/1
10 TABLET ORAL
Qty: 20 | Refills: 0 | Status: DISCONTINUED | COMMUNITY
Start: 2021-06-25

## 2021-11-15 RX ORDER — FLUOXETINE HYDROCHLORIDE 20 MG/1
20 CAPSULE ORAL
Qty: 60 | Refills: 0 | Status: DISCONTINUED | COMMUNITY
Start: 2021-07-16

## 2021-11-15 RX ORDER — MUPIROCIN 20 MG/G
2 OINTMENT TOPICAL
Qty: 22 | Refills: 0 | Status: DISCONTINUED | COMMUNITY
Start: 2021-05-21

## 2021-11-15 RX ORDER — ARIPIPRAZOLE 10 MG/1
10 TABLET ORAL
Qty: 30 | Refills: 0 | Status: DISCONTINUED | COMMUNITY
Start: 2021-07-16

## 2021-11-15 RX ORDER — PERMETHRIN 50 MG/G
5 CREAM TOPICAL
Qty: 60 | Refills: 0 | Status: DISCONTINUED | COMMUNITY
Start: 2021-06-25

## 2021-11-15 RX ORDER — DOXYCYCLINE HYCLATE 100 MG/1
100 CAPSULE ORAL
Qty: 20 | Refills: 0 | Status: DISCONTINUED | COMMUNITY
Start: 2021-06-17

## 2021-11-15 NOTE — HISTORY OF PRESENT ILLNESS
[Friend] : friend [FreeTextEntry1] : follow up  [de-identified] : MAGALY AGUILARNGELIS is a 36 year old M who presents today for follow up. Pt complains of skin lesions on scalp.

## 2021-11-15 NOTE — END OF VISIT
[FreeTextEntry3] : "I, Gracia Whitmore, personally scribed the services dictated to me by Dr. Narciso Martínez MD in this documentation on 11/15/2021 "\par \par "I Dr. Narciso Martínez MD, personally performed the services described in this documentation on 11/15/2021 for the patient as scribed by Gracia Whitmore in my presence. I have reviewed and verified that all the information is accurate and true."\par \par

## 2021-11-15 NOTE — HEALTH RISK ASSESSMENT
[] : Yes [No] : In the past 12 months have you used drugs other than those required for medical reasons? No [No falls in past year] : Patient reported no falls in the past year [0] : 2) Feeling down, depressed, or hopeless: Not at all (0) [PHQ-2 Negative - No further assessment needed] : PHQ-2 Negative - No further assessment needed [Good] : ~his/her~  mood as  good [de-identified] : about 1 PPD [[id number]] : 0

## 2021-11-30 ENCOUNTER — APPOINTMENT (OUTPATIENT)
Dept: OTOLARYNGOLOGY | Facility: CLINIC | Age: 36
End: 2021-11-30

## 2021-11-30 VITALS
DIASTOLIC BLOOD PRESSURE: 87 MMHG | HEART RATE: 71 BPM | BODY MASS INDEX: 28.7 KG/M2 | SYSTOLIC BLOOD PRESSURE: 133 MMHG | HEIGHT: 71 IN | WEIGHT: 205 LBS

## 2021-12-01 ENCOUNTER — APPOINTMENT (OUTPATIENT)
Dept: OTOLARYNGOLOGY | Facility: CLINIC | Age: 36
End: 2021-12-01
Payer: MEDICAID

## 2021-12-01 VITALS
WEIGHT: 205 LBS | DIASTOLIC BLOOD PRESSURE: 83 MMHG | HEART RATE: 97 BPM | BODY MASS INDEX: 28.7 KG/M2 | SYSTOLIC BLOOD PRESSURE: 121 MMHG | HEIGHT: 71 IN

## 2021-12-01 PROBLEM — R22.0 HEAD AND NECK SWELLING: Status: ACTIVE | Noted: 2021-10-20

## 2021-12-01 PROBLEM — S00.431A: Status: ACTIVE | Noted: 2021-10-19

## 2021-12-01 PROBLEM — S00.431A HEMATOMA OF RIGHT AURICULAR REGION: Status: ACTIVE | Noted: 2021-12-01

## 2021-12-01 PROCEDURE — 99213 OFFICE O/P EST LOW 20 MIN: CPT

## 2021-12-01 NOTE — ASSESSMENT
[FreeTextEntry1] : Reviewed and reconciled Meds, allergies, pmhx ,famhx, sochx, surghx\par \par 36 y.o male presents s/p incision and drainage of right auricular hematoma with reconstruction of right auricle on 10/26/21\par \par Pathology 10/26/21  \par Right auricular cartilage\par Final Diagnosis\par Right auricular cartilage, excisional procedure:\par - Cartilage and soft tissue, histologically unremarkable\par \par Sutures mostly dissolved\par \par Plan\par Can massage auricle between two fingers\par Wash normally and pat dry\par F/u in 4 months

## 2021-12-01 NOTE — HISTORY OF PRESENT ILLNESS
[de-identified] : 36 y.o male presents s/p incision and drainage of right auricular hematoma with reconstruction of right auricle on 10/26/21.

## 2021-12-01 NOTE — PHYSICAL EXAM
[Normal] : the left mastoid was normal [Hearing Loss Right Only] : normal [Hearing Loss Left Only] : normal [FreeTextEntry6] : sutures mostly dissolved

## 2021-12-03 NOTE — ASSESSMENT
[FreeTextEntry1] : doing well sp drainage and reconstruction right auricular hematoma\par to start massage of skin to smooth out the raised areas. fu 3 months

## 2021-12-03 NOTE — PHYSICAL EXAM
[Normal] : parotid gland, submandibular gland and thyroid glands are normal [de-identified] : multilobulated skin changes but cartilage and general form of right ear markedly improved from preop condition.

## 2022-01-06 ENCOUNTER — APPOINTMENT (OUTPATIENT)
Dept: DERMATOLOGY | Facility: CLINIC | Age: 37
End: 2022-01-06

## 2022-03-11 ENCOUNTER — APPOINTMENT (OUTPATIENT)
Dept: DERMATOLOGY | Facility: CLINIC | Age: 37
End: 2022-03-11
Payer: MEDICAID

## 2022-03-11 VITALS — WEIGHT: 210 LBS | BODY MASS INDEX: 29.4 KG/M2 | HEIGHT: 71 IN

## 2022-03-11 DIAGNOSIS — L98.9 DISORDER OF THE SKIN AND SUBCUTANEOUS TISSUE, UNSPECIFIED: ICD-10-CM

## 2022-03-11 PROCEDURE — 99214 OFFICE O/P EST MOD 30 MIN: CPT

## 2022-03-11 RX ORDER — DOXYCYCLINE HYCLATE 100 MG/1
100 TABLET ORAL
Qty: 28 | Refills: 3 | Status: ACTIVE | COMMUNITY
Start: 2022-03-11 | End: 1900-01-01

## 2022-03-11 RX ORDER — MUPIROCIN 20 MG/G
2 OINTMENT TOPICAL
Qty: 1 | Refills: 3 | Status: ACTIVE | COMMUNITY
Start: 2022-03-11 | End: 1900-01-01

## 2022-03-11 NOTE — HISTORY OF PRESENT ILLNESS
[FreeTextEntry1] : bumps on skin [de-identified] : 36 year old male here for bumps on face and scalp.

## 2022-03-11 NOTE — PHYSICAL EXAM
[FreeTextEntry3] : AAOx3, pleasant, NAD, no visual lymphadenopathy\par hair, scalp, face, nose, eyelids, ears, lips, oropharynx, neck, chest, abdomen, back, right arm, left arm, nails, and hands examined with all normal findings,\par pertinent findings include:\par \par follicular papules in beard and scalp

## 2022-03-11 NOTE — ASSESSMENT
[FreeTextEntry1] : folliculitis\par education\par doxy 100 mg PO BID x2 weeks; SED\par mupirocin daily x 7 days in nares; SED\par \par f/u 4 weeks

## 2022-03-21 DIAGNOSIS — Z01.812 ENCOUNTER FOR PREPROCEDURAL LABORATORY EXAMINATION: ICD-10-CM

## 2022-03-22 ENCOUNTER — APPOINTMENT (OUTPATIENT)
Dept: INTERNAL MEDICINE | Facility: CLINIC | Age: 37
End: 2022-03-22

## 2022-04-04 ENCOUNTER — APPOINTMENT (OUTPATIENT)
Dept: PULMONOLOGY | Facility: CLINIC | Age: 37
End: 2022-04-04

## 2022-04-12 ENCOUNTER — APPOINTMENT (OUTPATIENT)
Dept: PULMONOLOGY | Facility: CLINIC | Age: 37
End: 2022-04-12

## 2022-04-25 ENCOUNTER — APPOINTMENT (OUTPATIENT)
Dept: DERMATOLOGY | Facility: CLINIC | Age: 37
End: 2022-04-25

## 2022-05-27 ENCOUNTER — APPOINTMENT (OUTPATIENT)
Dept: DERMATOLOGY | Facility: CLINIC | Age: 37
End: 2022-05-27
Payer: MEDICAID

## 2022-05-27 DIAGNOSIS — R21 RASH AND OTHER NONSPECIFIC SKIN ERUPTION: ICD-10-CM

## 2022-05-27 DIAGNOSIS — R22.0 LOCALIZED SWELLING, MASS AND LUMP, HEAD: ICD-10-CM

## 2022-05-27 PROCEDURE — 99214 OFFICE O/P EST MOD 30 MIN: CPT

## 2022-05-27 RX ORDER — MINOCYCLINE HYDROCHLORIDE 100 MG/1
100 TABLET ORAL
Qty: 42 | Refills: 0 | Status: ACTIVE | COMMUNITY
Start: 2022-05-27 | End: 1900-01-01

## 2022-06-10 RX ORDER — MINOCYCLINE HYDROCHLORIDE 100 MG/1
100 CAPSULE ORAL
Qty: 14 | Refills: 2 | Status: ACTIVE | COMMUNITY
Start: 2022-06-10 | End: 1900-01-01

## 2022-08-31 ENCOUNTER — APPOINTMENT (OUTPATIENT)
Dept: DERMATOLOGY | Facility: CLINIC | Age: 37
End: 2022-08-31

## 2023-05-16 NOTE — ED PROVIDER NOTE - PATIENT PORTAL LINK FT
You can access the FollowMyHealth Patient Portal offered by [facility] by registering at the following website: [email or web address]. By joining [employer]’s FollowMyHealth portal, you will also be able to view your health information using other applications (apps) compatible with our system. Nostril Rim Text: The closure involved the nostril rim.

## 2025-07-08 NOTE — ASU PREOPERATIVE ASSESSMENT, ADULT (IPARK ONLY) - AMOUNT
"When opening a documentation only encounter, be sure to enter in \"Chief Complaint\" Forms and in \" Comments\" Title of form, description if needed.    Al is a 72 year old  male  Form received via: Fax  Form now resides in: Provider Ready    Margret Steiner, [facility]               "
1 ppd